# Patient Record
Sex: MALE | Race: WHITE | NOT HISPANIC OR LATINO | URBAN - METROPOLITAN AREA
[De-identification: names, ages, dates, MRNs, and addresses within clinical notes are randomized per-mention and may not be internally consistent; named-entity substitution may affect disease eponyms.]

---

## 2021-05-25 ENCOUNTER — EMERGENCY (EMERGENCY)
Facility: HOSPITAL | Age: 41
LOS: 1 days | Discharge: HOME | End: 2021-05-25
Attending: STUDENT IN AN ORGANIZED HEALTH CARE EDUCATION/TRAINING PROGRAM
Payer: SELF-PAY

## 2021-05-25 VITALS
DIASTOLIC BLOOD PRESSURE: 83 MMHG | TEMPERATURE: 98 F | SYSTOLIC BLOOD PRESSURE: 117 MMHG | HEIGHT: 68 IN | RESPIRATION RATE: 20 BRPM | HEART RATE: 93 BPM | WEIGHT: 184.97 LBS | OXYGEN SATURATION: 96 %

## 2021-05-25 PROCEDURE — 99053 MED SERV 10PM-8AM 24 HR FAC: CPT

## 2021-05-25 PROCEDURE — 99285 EMERGENCY DEPT VISIT HI MDM: CPT

## 2021-05-25 PROCEDURE — 99284 EMERGENCY DEPT VISIT MOD MDM: CPT

## 2021-05-25 NOTE — ED ADULT TRIAGE NOTE - CHIEF COMPLAINT QUOTE
Pt. BIBA after ems called to gas station where pt. found intoxicated with unsteady gait.  As per EMS, [facility] called EMS due to pt. wandering into street.  Pt. admits to drinking beer.  Pt states "I had 39 beers."  Pt. denies injury.  NO obvious trauma or injury noted.  Pt. changed into yellow gown and belongings secured for pt. safety.  G[facility] 14 on arrival; alert, awake and tearful at times

## 2021-05-25 NOTE — ED PROVIDER NOTE - NSFOLLOWUPINSTRUCTIONS_ED_ALL_ED_FT
[facility] ALANNAH Carroll, [address] [place] [address], [place] [address]  Phone: [phone]  Email: [email or web address]    About [facility]  [facility] is committed to facilitating partnerships among schools, communities and prevention providers to focus on the provision of effective strategies to deal with alcohol, drug, and tobacco abuse and problem gambling.    [facility] strives to meet this challenge by collaborating with community-based providers gathered to form strong local coalitions. [facility] functions as a centralized clearinghouse of resources for youth, parents, professionals, treatment providers, educators, the media and the general public. We provide this education through a comprehensive continuum of resources, trainings, and community-related services. We serve as a liaison to support coalitions and prevention providers with the integration of prevention science to practice.    [facility] utilizes the Strategic Prevention Planning Framework to promote strategies that are designed to reduce risk factors confronted by communities and families.  By reducing risk factors and promoting protective factors within communities, families and individuals, we minimize the negative effects of substance abuse.    The [facility] has been a valuable resource for many agencies and community groups. Various community coalitions have looked to the [facility] for training, guidance and support in forming and sustaining their community coalitions to best address the detrimental social issues posed by Alcoholism and Substance abuse.    For more information about [facility] or information on community coalitions please contact [phone].

## 2021-05-25 NOTE — ED ADULT NURSE NOTE - OBJECTIVE STATEMENT
received pt alert and disoriented, stable, sleep throughout the night, sating well on room air, no distress, ambulate well.   continue to monitor

## 2021-05-25 NOTE — ED PROVIDER NOTE - OBJECTIVE STATEMENT
HPI:   Mukund Donovan is a 46year old female who presents for a complete physical exam. Symptoms: denies discharge, itching, burning or dysuria. Patient complains of here for review of recent labs she had done and also to discuss weight loss.  My partne Yes             Occ:  at [employer]. : . Children: 2.    Exercise: minimal.  Diet: watches minimally     REVIEW OF SYSTEMS:   GENERAL: feels well otherwise  SKIN: denies any unusual skin lesions  EYES:denies blurred vision or double minutes three times weekly. The patient indicates understanding of these issues and agrees to the plan. The patient is asked to return for CPX in 1 year.     Routine health maintenance  (primary encounter diagnosis)  Non morbid obesity due to excess calor PT with SPMHX of  ETOH abuse  BIBA to the ED with complaint of intoxication. Pt states that he had sig number of drinks exact count unknown Pt states that he feels well and has no other complaints then intoxication.

## 2021-05-25 NOTE — ED PROVIDER NOTE - CLINICAL SUMMARY MEDICAL DECISION MAKING FREE TEXT BOX
PT with stable VS, no acute distress, non toxic appearing, tolerating PO in the ED, PT demonstrates decision making ability no s/s of dianne, psychosis, A&O x3 in no acute distress no HI/SI. PT cooperative with staff, no s/s of trama, appears clinically sober, pt to be dc home with supportive care, educated about when to return to the ED if needed. PT verbalizes that he understands all instructions and results. Pt infomred that ED is open and availible 24/7 365 days a yr, encouraged to return to the ED if they have any change in condition, or feel the need for revaluation.    utilized to obtain History, ROS, Physical Exam, explanations of results and plan of care, as well as follow up instructions.

## 2021-05-25 NOTE — ED PROVIDER NOTE - NEUROLOGICAL, MLM
Alert and oriented, no focal deficits, no motor or sensory deficits. moving all four limbs equally following commands.

## 2021-05-25 NOTE — ED PROVIDER NOTE - PATIENT PORTAL LINK FT
You can access the FollowMyHealth Patient Portal offered by [facility] by registering at the following website: [email or web address]. By joining [employer]’s FollowMyHealth portal, you will also be able to view your health information using other applications (apps) compatible with our system.

## 2021-05-25 NOTE — ED PROVIDER NOTE - ATTENDING CONTRIBUTION TO CARE
40 yo male well known to our department due to recurrent alcohol intoxication. Patient with indication of acute intoxication without focal neurologic or traumatic findings. I personally saw the patient with the PA, and completed the key components of the history and physical exam. I then discussed the management plan with the PA.